# Patient Record
Sex: FEMALE
[De-identification: names, ages, dates, MRNs, and addresses within clinical notes are randomized per-mention and may not be internally consistent; named-entity substitution may affect disease eponyms.]

---

## 2021-12-16 PROBLEM — Z00.00 ENCOUNTER FOR PREVENTIVE HEALTH EXAMINATION: Status: ACTIVE | Noted: 2021-12-16

## 2022-01-13 ENCOUNTER — APPOINTMENT (OUTPATIENT)
Dept: ENDOCRINOLOGY | Facility: CLINIC | Age: 32
End: 2022-01-13

## 2022-02-23 ENCOUNTER — APPOINTMENT (OUTPATIENT)
Dept: ENDOCRINOLOGY | Facility: CLINIC | Age: 32
End: 2022-02-23
Payer: MEDICAID

## 2022-02-23 VITALS
DIASTOLIC BLOOD PRESSURE: 69 MMHG | HEIGHT: 62 IN | WEIGHT: 134 LBS | HEART RATE: 61 BPM | BODY MASS INDEX: 24.66 KG/M2 | SYSTOLIC BLOOD PRESSURE: 102 MMHG

## 2022-02-23 DIAGNOSIS — N92.0 EXCESSIVE AND FREQUENT MENSTRUATION WITH REGULAR CYCLE: ICD-10-CM

## 2022-02-23 PROCEDURE — 99205 OFFICE O/P NEW HI 60 MIN: CPT

## 2022-02-24 NOTE — ASSESSMENT
[FreeTextEntry1] : 32 year y/o F pt with:\par \par 1. Hyperandrogenism (reported in lab test from 11/23/21: Total Testosterone 91 (2-45):\par This test was ordered following the pt's complaint of facial hirsutism noticed approximately 1 year ago. \par She also states that her voice is getting deeper, with lower pitch, in the past 3 months. Her menstrual periods are getting heavier and occurring in ~30-32 days. \par Pt has paternal cousins with history of PCOS, but denies family history of congenital adrenal hyperplasia. No family hx of MEN. No evidence of virilization \par She does not have an immediate plan to have a family. \par Overview on increased levels of androgens were explained to pt and her mother (her mother was on the phone with us). \par Labs ordered. \par \par Return in: 3 weeks.

## 2022-02-24 NOTE — DATA REVIEWED
[FreeTextEntry1] : Labs:\par - 11/23/21: Total Testosterone 91 (H), Free Testosterone 9.5 (H), Free T4 2.3, TSH 0.77, T3 uptake 30, Total T4 7.8

## 2022-02-24 NOTE — HISTORY OF PRESENT ILLNESS
[FreeTextEntry1] : 32 year y/o F pt, with Hx of Hyperandrogenism (reported in lab test from 11/23/21: Total Testosterone 91 (2-45)), referred by GEGE Vasquez, presents today to establish endocrine care with me. 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 Paternal cousins: PCOS. Denies FHx of: Congenital adrenal Hyperplasia, hyperandrogenism.\par SHx: No siblings. never smoker; social EtOH use: 3-5 drinks/ week. No recreational drugs. 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 \par Vaccinations: COVID\par FMP: 12. Regular periods since first period, but heavy. No history of pregnancies. Not sexually active. \par Psychiatry NP Mackenzie Vasquez.\par PCP: Dr. 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 Pt was complaining of hirsutism (onset 1 year) and visited an OBGYN 3 months ago for an exam. She was then referred by MIMA Vasquez for Hirsutism, and Hyperandrogenism (high testosterone levels). She also notes that a Pelvic US was done as well (however, this report is unavailable). \par \par Pt mainly complains of hirsutism, mostly in breast, suprapubic area, and chin. She notes that she has been on the hairier side, but her hair grew thicker and more frequently these past few months. Pt shaves and lasers for her excessive hair growth.\par She also endorses hair thinning, hair loss, occasional acne and dysphonia (feels deeper since 3 months ago). \par Her first menses was at age 12. Although her period cycle is regular, she c/o of worsening menorrhagia and abdominal pain before and after her menses. \par Pt denies oral contraceptive use. She is not sexually active and has had no pregnancies in the past. \par \par She also is feeling depressed, which has worsened. She believes that her work and school might be attributing to her mood. However, she has never been diagnosed with depression before or engaged in self-harm. Pt endorses consistent HA with onset ~ 6 months. She also notes a decrease in her appetite. She has not been able to eat much because although she is hungry, she doesn't want to eat.\par \par Current Medications: Lexapro (initiated 3 days ago by a NP)\par \par Labs:\par - 11/23/21: Total Testosterone 91 (H), Free Testosterone 9.5 (H), Free T4 2.3, TSH 0.77, T3 uptake 30, Total T4 7.8

## 2022-02-24 NOTE — PHYSICAL EXAM
[Alert] : alert [Normal Sclera/Conjunctiva] : normal sclera/conjunctiva [Normal Outer Ear/Nose] : the ears and nose were normal in appearance [No Neck Mass] : no neck mass was observed [No Respiratory Distress] : no respiratory distress [Normal Rate] : heart rate was normal [Regular Rhythm] : with a regular rhythm [No Edema] : no peripheral edema [Soft] : abdomen soft [Spine Straight] : spine straight [No Stigmata of Cushings Syndrome] : no stigmata of Cushings Syndrome [Normal Gait] : normal gait [No Rash] : no rash [Normal Reflexes] : deep tendon reflexes were 2+ and symmetric [Oriented x3] : oriented to person, place, and time [Hirsutism] : hirsutism present [de-identified] : Chin and beard area.

## 2022-02-24 NOTE — ADDENDUM
[FreeTextEntry1] : I Monroejenny Longo act soley as a scribe for Dr. Gasper Hernandez on this date. 02/23/2022

## 2022-02-24 NOTE — END OF VISIT
[FreeTextEntry3] : All medical record entries made by the Scribe were at my, Dr. Gasper Hernandez, direction and personally dictated by me on 02/23/2022. I have reviewed the chart and agree that the record accurately reflects my personal performance of the history, physical exam, assessment and plan. I have also personally directed, reviewed and agreed with the chart.  [Time Spent: ___ minutes] : I have spent [unfilled] minutes of time on the encounter.

## 2022-02-24 NOTE — CONSULT LETTER
[Dear  ___] : Dear  [unfilled], [Consult Letter:] : I had the pleasure of evaluating your patient, [unfilled]. [Sincerely,] : Sincerely, [FreeTextEntry3] : Gasper Hernandez

## 2022-02-24 NOTE — REVIEW OF SYSTEMS
[Negative] : Heme/Lymph [Excessive Bleeding During Period] : excessive bleeding during period [Acne] : acne [Hirsutism] : hirsutism [Decreased Appetite] : decreased appetite [Dysphonia] : dysphonia [Hair Loss] : hair loss [As Noted in HPI] : as noted in HPI [Headaches] : headaches [Irregular Cycle Intervals] : periods are regular [FreeTextEntry8] : Pain before and during menses.  [de-identified] : Hair growth mostly in breast, suprapubic area, chin and beard area. Endorses hair thinning.  [de-identified] : Depressive mood.

## 2022-03-17 ENCOUNTER — APPOINTMENT (OUTPATIENT)
Dept: ENDOCRINOLOGY | Facility: CLINIC | Age: 32
End: 2022-03-17
Payer: MEDICAID

## 2022-03-17 DIAGNOSIS — L68.0 HIRSUTISM: ICD-10-CM

## 2022-03-17 DIAGNOSIS — E28.8 OTHER OVARIAN DYSFUNCTION: ICD-10-CM

## 2022-03-17 PROCEDURE — 99213 OFFICE O/P EST LOW 20 MIN: CPT

## 2022-03-18 PROBLEM — E28.8 HYPERANDROGENISM: Status: ACTIVE | Noted: 2022-02-23

## 2022-03-18 PROBLEM — L68.0 HIRSUTISM: Status: ACTIVE | Noted: 2022-02-23

## 2022-03-18 NOTE — ADDENDUM
[FreeTextEntry1] : I Chaz Donta act soley as a scribe for Dr. Gasper Hernandez on this date. 03/17/2022

## 2022-03-18 NOTE — REVIEW OF SYSTEMS
[Dysphonia] : dysphonia [Hirsutism] : hirsutism [Hair Loss] : hair loss [As Noted in HPI] : as noted in HPI [Depression] : depression [Negative] : Genitourinary [Blurred Vision] : no blurred vision [Irregular Menses] : regular menses [Irregular Cycle Intervals] : periods are regular [Acne] : no acne [de-identified] : Hair growth mostly in breast, suprapubic area, chin and beard area. Endorses hair thinning.

## 2022-03-18 NOTE — HISTORY OF PRESENT ILLNESS
[FreeTextEntry1] : 32 year y/o F pt, with Hx of Hyperandrogenism (reported in lab test from 11/23/21: Total Testosterone 91 (2-45)), referred by GEGE Vasquez, presents today to establish endocrine care with me. 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 Paternal cousins: PCOS. Denies FHx of: Congenital adrenal Hyperplasia, hyperandrogenism.\par SHx: No siblings. never smoker; social EtOH use: 3-5 drinks/ week. No recreational drugs. 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 \par Vaccinations: COVID\par FMP: 12. Regular periods since first period, but heavy. No history of pregnancies. Not sexually active. \par Seeing Psychiatry GEGE Vasquez for Depression. 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 Pt was complaining of hirsutism (onset 1 year) and visited an OBGYN 3 months ago for an exam. She was then referred by MIMA Vasquez for Hirsutism, and Hyperandrogenism (high testosterone levels). She also notes that a Pelvic US was done as well (however, this report is unavailable). \par \par Pt mainly complains of hirsutism, mostly in breast, suprapubic area, and chin. She notes that she has been on the hairier side, but her hair grew thicker and more frequently these past few months. Pt shaves and lasers for her excessive hair growth.\par She also endorses hair thinning, hair loss, occasional acne and dysphonia (feels deeper since 3 months ago). \par Her first menses was at age 12. Although her period cycle is regular, she c/o of worsening menorrhagia and abdominal pain before and after her menses. \par Pt denies oral contraceptive use. She is not sexually active and has had no pregnancies in the past. \par \par She also is feeling depressed, which has worsened. She believes that her work and school might be attributing to her mood. However, she has never been diagnosed with depression before or engaged in self-harm. Pt endorses consistent HA with onset ~ 6 months. She also notes a decrease in her appetite. She has not been able to eat much because although she is hungry, she doesn't want to eat.\par \par 03/17/2022\par Pt presents today for endocrine f/u. She notes that her hair volume decreased, but doesn't notice bald spots. She also lost weight. \par Her last menstrual period was ~03/03/22. She had menses in January and Feb. Pt had no history of pregnancies. \par She endorses excessive hair growth in breast and thighs. Regarding her hirsutism, she treats it by lasering and shaving. \par In addition, pt c/o migraines, which has been more intense in the past 2 weeks. However, no one treats her for her migraines. She notes that she was prescribed Lexapro by a psychiatrist ~3 weeks ago and thinks that the "migraines have been caused by her medication". She is seeing a psychiatrist for depression. Her first visit was 1 month ago. \par Pt completed her blood test from [facility] a week ago. She also had an US completed, which she requested the office to forward to ours, but they have not. \par Denies irregular menses, acne, breast discharge, or blurry vision.\par \par Current Medications: Lexapro (initiated 3 days ago by a NP)\par \par Labs:\par - 03/10/22: Total Testosterone 74 , 17-OH progesterone 195\par - 11/23/21: Total Testosterone 91 (H), Free Testosterone 9.5 (H), Free T4 2.3, TSH 0.77, T3 uptake 30, Total T4 7.8

## 2022-03-18 NOTE — ASSESSMENT
[FreeTextEntry1] : 32 year y/o F pt with:\par \par 1. Hyperandrogenism (reported in lab test from 11/23/21: Total Testosterone 91 (2-45):\par Hx of irregular menses with increased levels of total testosterone and normal levels of 17-OH progesterone. These findings are c/w PCOS. \par Pt was explained that she is at increased risk to develop obesity, and cardiac metabolic disorders. \par Recommended to see an OBGYN \par \par Return in: 6 months.  [Importance of Diet and Exercise] : importance of diet and exercise to improve glycemic control, achieve weight loss and improve cardiovascular health

## 2022-03-18 NOTE — PHYSICAL EXAM
[Alert] : alert [Normal Sclera/Conjunctiva] : normal sclera/conjunctiva [No Neck Mass] : no neck mass was observed [Normal Outer Ear/Nose] : the ears and nose were normal in appearance [No Respiratory Distress] : no respiratory distress [Normal Rate] : heart rate was normal [Regular Rhythm] : with a regular rhythm [No Edema] : no peripheral edema [Soft] : abdomen soft [Spine Straight] : spine straight [No Stigmata of Cushings Syndrome] : no stigmata of Cushings Syndrome [Normal Gait] : normal gait [No Rash] : no rash [Hirsutism] : hirsutism present [Normal Reflexes] : deep tendon reflexes were 2+ and symmetric [Oriented x3] : oriented to person, place, and time [de-identified] : Chin and beard area.

## 2022-03-18 NOTE — DATA REVIEWED
[FreeTextEntry1] : Labs:\par - 03/10/22: Total Testosterone 74 , 17-OH progesterone 195\par - 11/23/21: Total Testosterone 91 (H), Free Testosterone 9.5 (H), Free T4 2.3, TSH 0.77, T3 uptake 30, Total T4 7.8

## 2022-03-18 NOTE — END OF VISIT
[FreeTextEntry3] : All medical record entries made by the Scribe were at my, Dr. Gasper Hernandez, direction and personally dictated by me on 03/17/2022. I have reviewed the chart and agree that the record accurately reflects my personal performance of the history, physical exam, assessment and plan. I have also personally directed, reviewed and agreed with the chart.  [Time Spent: ___ minutes] : I have spent [unfilled] minutes of time on the encounter.

## 2022-04-25 LAB
11DOC SERPL-MCNC: 6.6 NG/DL
17OHP SERPL-MCNC: 252 NG/DL
ANDROST SERPL-MCNC: 246 NG/DL
ANDROSTANOLONE SERPL-MCNC: 31 NG/DL
DHEA-SULFATE, SERUM: 117 UG/DL
ESTRADIOL SERPL-MCNC: 122 PG/ML
FSH SERPL-MCNC: 4.6 IU/L
LH SERPL-ACNC: 10.3 IU/L
PROLACTIN SERPL-MCNC: 11 NG/ML
TESTOST FREE SERPL-MCNC: 2.1 PG/ML
TESTOST SERPL-MCNC: 46.6 NG/DL
TSH SERPL-ACNC: 0.69 UIU/ML